# Patient Record
Sex: MALE | Race: WHITE | NOT HISPANIC OR LATINO | URBAN - NONMETROPOLITAN AREA
[De-identification: names, ages, dates, MRNs, and addresses within clinical notes are randomized per-mention and may not be internally consistent; named-entity substitution may affect disease eponyms.]

---

## 2020-04-27 PROBLEM — Z00.00 ENCOUNTER FOR PREVENTIVE HEALTH EXAMINATION: Status: ACTIVE | Noted: 2020-04-27

## 2024-01-26 ENCOUNTER — TELEPHONE ENCOUNTER (OUTPATIENT)
Dept: URBAN - NONMETROPOLITAN AREA CLINIC 2 | Facility: CLINIC | Age: 84
End: 2024-01-26

## 2024-01-31 ENCOUNTER — DASHBOARD ENCOUNTERS (OUTPATIENT)
Age: 84
End: 2024-01-31

## 2024-01-31 ENCOUNTER — OFFICE VISIT (OUTPATIENT)
Dept: URBAN - NONMETROPOLITAN AREA CLINIC 2 | Facility: CLINIC | Age: 84
End: 2024-01-31
Payer: MEDICARE

## 2024-01-31 VITALS
WEIGHT: 171 LBS | HEART RATE: 102 BPM | BODY MASS INDEX: 25.33 KG/M2 | SYSTOLIC BLOOD PRESSURE: 102 MMHG | HEIGHT: 69 IN | DIASTOLIC BLOOD PRESSURE: 62 MMHG

## 2024-01-31 DIAGNOSIS — K44.9 PARAESOPHAGEAL HERNIA: ICD-10-CM

## 2024-01-31 DIAGNOSIS — K86.9 PANCREATIC DISEASE: ICD-10-CM

## 2024-01-31 PROCEDURE — 99204 OFFICE O/P NEW MOD 45 MIN: CPT

## 2024-01-31 RX ORDER — METOPROLOL SUCCINATE 25 MG/1
TABLET, EXTENDED RELEASE ORAL
Qty: 0 | Refills: 0 | Status: ACTIVE | COMMUNITY
Start: 1900-01-01 | End: 1900-01-01

## 2024-01-31 RX ORDER — ROSUVASTATIN CALCIUM 20 MG/1
TABLET, FILM COATED ORAL
Qty: 0 | Refills: 0 | Status: ACTIVE | COMMUNITY
Start: 1900-01-01 | End: 1900-01-01

## 2024-01-31 RX ORDER — METFORMIN HYDROCHLORIDE 500 MG/1
TABLET, COATED ORAL 2
Qty: 0 | Refills: 0 | Status: ACTIVE | COMMUNITY
Start: 1900-01-01 | End: 1900-01-01

## 2024-01-31 RX ORDER — LOSARTAN POTASSIUM 50 MG/1
TAKE 1 TABLET (50 MG) BY ORAL ROUTE ONCE DAILY TABLET ORAL 1
Qty: 0 | Refills: 0 | Status: ACTIVE | COMMUNITY
Start: 1900-01-01 | End: 1900-01-01

## 2024-01-31 RX ORDER — FOLIC ACID 1 MG/1
TAKE 1 TABLET (1 MG) BY ORAL ROUTE ONCE DAILY TABLET ORAL 1
Qty: 0 | Refills: 0 | Status: ACTIVE | COMMUNITY
Start: 1900-01-01 | End: 1900-01-01

## 2024-01-31 NOTE — HPI-TODAY'S VISIT:
1/31/24 Mr. Lopez presents to discuss pancreatic cyst with pmh of CAD s/p CABG 2015, HTN, HLD and fatty liver. He was admitted in Dec 2023 to [facility] for chills and malaise, with necrotizing pancreatitis and peripancreatic collection.  Pancreatic cyst in tail region  on CT scan along with elevated LFTs recommended to complete EUS outpatient.  No previous pancreatitis episodes. 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 Pancreatic collection extended into hernia sac. He is symptomatic with his paraesophageal hernia and anticipates surgical evaluation which is complex and he has been referred out of [place] to [place] by Dr. Le. EUS with FNA is recommended prior. He continues with pending appointment with Dr. Martinez with thoracic surgery. He denies epigastric pain and has a low appetite. He is able to drink 3 supplement drinks a day and tolerates his diet including tuna on crackers with no pain or nausea. Main complaint today is weakness. Today he is using a wheelchair, previously able to ambulate.  He was drinking one glass of wine daily prior to diagnosis and has discontinued. Today we discussed reviewing his case with our advanced endoscopist and he will return to ED if weakness advances.  Discussed with Dr. Gagnon in clinic along with patient, risk v benefit of completing this in [place] v. [place] given optimal resources. SHIKHA

## 2024-02-15 PROBLEM — 3662000: Status: ACTIVE | Noted: 2024-02-15

## 2024-02-15 PROBLEM — 3855007: Status: ACTIVE | Noted: 2024-02-15

## 2024-08-06 ENCOUNTER — OFFICE VISIT (OUTPATIENT)
Dept: URBAN - NONMETROPOLITAN AREA CLINIC 2 | Facility: CLINIC | Age: 84
End: 2024-08-06
Payer: MEDICARE

## 2024-08-06 ENCOUNTER — LAB OUTSIDE AN ENCOUNTER (OUTPATIENT)
Dept: URBAN - NONMETROPOLITAN AREA CLINIC 2 | Facility: CLINIC | Age: 84
End: 2024-08-06

## 2024-08-06 VITALS
WEIGHT: 185 LBS | BODY MASS INDEX: 27.4 KG/M2 | HEART RATE: 86 BPM | TEMPERATURE: 98.3 F | SYSTOLIC BLOOD PRESSURE: 125 MMHG | HEIGHT: 69 IN | DIASTOLIC BLOOD PRESSURE: 64 MMHG

## 2024-08-06 DIAGNOSIS — K44.9 PARAESOPHAGEAL HERNIA: ICD-10-CM

## 2024-08-06 DIAGNOSIS — R93.5 ABNORMAL CT OF THE ABDOMEN: ICD-10-CM

## 2024-08-06 DIAGNOSIS — K85.90 ACUTE PANCREATITIS, UNSPECIFIED COMPLICATION STATUS, UNSPECIFIED PANCREATITIS TYPE: ICD-10-CM

## 2024-08-06 PROCEDURE — 99214 OFFICE O/P EST MOD 30 MIN: CPT | Performed by: NURSE PRACTITIONER

## 2024-08-06 RX ORDER — FOLIC ACID 1 MG/1
TAKE 1 TABLET (1 MG) BY ORAL ROUTE ONCE DAILY TABLET ORAL 1
Qty: 0 | Refills: 0 | Status: ACTIVE | COMMUNITY
Start: 1900-01-01

## 2024-08-06 RX ORDER — LOSARTAN POTASSIUM 50 MG/1
TAKE 1 TABLET (50 MG) BY ORAL ROUTE ONCE DAILY TABLET ORAL 1
Qty: 0 | Refills: 0 | Status: ACTIVE | COMMUNITY
Start: 1900-01-01

## 2024-08-06 RX ORDER — METOPROLOL SUCCINATE 25 MG/1
TABLET, EXTENDED RELEASE ORAL
Qty: 0 | Refills: 0 | Status: ACTIVE | COMMUNITY
Start: 1900-01-01

## 2024-08-06 RX ORDER — ROSUVASTATIN CALCIUM 20 MG/1
TABLET, FILM COATED ORAL
Qty: 0 | Refills: 0 | Status: ACTIVE | COMMUNITY
Start: 1900-01-01

## 2024-08-06 RX ORDER — METFORMIN HYDROCHLORIDE 500 MG/1
TABLET, COATED ORAL 2
Qty: 0 | Refills: 0 | Status: ACTIVE | COMMUNITY
Start: 1900-01-01

## 2024-08-06 RX ORDER — SODIUM, POTASSIUM,MAG SULFATES 17.5-3.13G
AS DIRECTED SOLUTION, RECONSTITUTED, ORAL ORAL
Qty: 1 | Refills: 0 | OUTPATIENT
Start: 2024-08-06 | End: 2024-08-08

## 2024-08-06 NOTE — HPI-TODAY'S VISIT:
1/31/24 Mr. Lopez presents to discuss pancreatic cyst with pmh of CAD s/p CABG 2015, HTN, HLD and fatty liver. He was admitted in Dec 2023 to [facility] for chills and malaise, with necrotizing pancreatitis and peripancreatic collection.  Pancreatic cyst in tail region  on CT scan along with elevated LFTs recommended to complete EUS outpatient.  No previous pancreatitis episodes. 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 Pancreatic collection extended into hernia sac. He is symptomatic with his paraesophageal hernia and anticipates surgical evaluation which is complex and he has been referred out of [place] to [place] by Dr. Le. EUS with FNA is recommended prior. He continues with pending appointment with Dr. Martinez with thoracic surgery. He denies epigastric pain and has a low appetite. He is able to drink 3 supplement drinks a day and tolerates his diet including tuna on crackers with no pain or nausea. Main complaint today is weakness. Today he is using a wheelchair, previously able to ambulate.  He was drinking one glass of wine daily prior to diagnosis and has discontinued. Today we discussed reviewing his case with our advanced endoscopist and he will return to ED if weakness advances.  Discussed with Dr. Gagnon in clinic along with patient, risk v benefit of completing this in [place] v. [place] given optimal resources. SP 8/6/24 Patient returns of f/u of pancreatic cyst as well to discuss colon. has been seen by [facility]. s/p EUS with draingage of cyst. symptoms much improved. known paraesophageal hernia, but reports he is doing well so declining sx. they are repeating imaging every 3-6 months.  he is here today for colonoscopy. one of his CTs showed rectal wall thickening. no gi complaints. agreeable to colon. 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  his high school sweetheart 7 years ago. sb

## 2024-09-04 ENCOUNTER — CLAIMS CREATED FROM THE CLAIM WINDOW (OUTPATIENT)
Dept: URBAN - METROPOLITAN AREA CLINIC 4 | Facility: CLINIC | Age: 84
End: 2024-09-04
Payer: MEDICARE

## 2024-09-04 ENCOUNTER — CLAIMS CREATED FROM THE CLAIM WINDOW (OUTPATIENT)
Dept: URBAN - NONMETROPOLITAN AREA SURGERY CENTER 1 | Facility: SURGERY CENTER | Age: 84
End: 2024-09-04
Payer: MEDICARE

## 2024-09-04 DIAGNOSIS — R93.3 ABNORMAL FINDINGS ON DIAGNOSTIC IMAGING OF OTHER PARTS OF DIGESTIVE TRACT: ICD-10-CM

## 2024-09-04 DIAGNOSIS — R93.3 ABNORMAL FINDING ON DIAGNOSTIC IMAGING OF GI TRACT: ICD-10-CM

## 2024-09-04 DIAGNOSIS — D12.2 BENIGN NEOPLASM OF ASCENDING COLON: ICD-10-CM

## 2024-09-04 DIAGNOSIS — K63.5 POLYP OF ASCENDING COLON, UNSPECIFIED TYPE: ICD-10-CM

## 2024-09-04 DIAGNOSIS — K57.30 DIVERTICULOSIS OF COLON: ICD-10-CM

## 2024-09-04 PROCEDURE — 88305 TISSUE EXAM BY PATHOLOGIST: CPT | Performed by: PATHOLOGY

## 2024-09-04 PROCEDURE — 00811 ANES LWR INTST NDSC NOS: CPT | Performed by: NURSE ANESTHETIST, CERTIFIED REGISTERED

## 2024-09-04 PROCEDURE — 45385 COLONOSCOPY W/LESION REMOVAL: CPT | Performed by: CLINIC/CENTER

## 2024-09-04 PROCEDURE — 45385 COLONOSCOPY W/LESION REMOVAL: CPT | Performed by: INTERNAL MEDICINE

## 2024-09-04 RX ORDER — METFORMIN HYDROCHLORIDE 500 MG/1
TABLET, COATED ORAL 2
Qty: 0 | Refills: 0 | Status: ACTIVE | COMMUNITY
Start: 1900-01-01

## 2024-09-04 RX ORDER — METOPROLOL SUCCINATE 25 MG/1
TABLET, EXTENDED RELEASE ORAL
Qty: 0 | Refills: 0 | Status: ACTIVE | COMMUNITY
Start: 1900-01-01

## 2024-09-04 RX ORDER — LOSARTAN POTASSIUM 50 MG/1
TAKE 1 TABLET (50 MG) BY ORAL ROUTE ONCE DAILY TABLET ORAL 1
Qty: 0 | Refills: 0 | Status: ACTIVE | COMMUNITY
Start: 1900-01-01

## 2024-09-04 RX ORDER — FOLIC ACID 1 MG/1
TAKE 1 TABLET (1 MG) BY ORAL ROUTE ONCE DAILY TABLET ORAL 1
Qty: 0 | Refills: 0 | Status: ACTIVE | COMMUNITY
Start: 1900-01-01

## 2024-09-04 RX ORDER — ROSUVASTATIN CALCIUM 20 MG/1
TABLET, FILM COATED ORAL
Qty: 0 | Refills: 0 | Status: ACTIVE | COMMUNITY
Start: 1900-01-01

## 2024-11-08 ENCOUNTER — OFFICE VISIT (OUTPATIENT)
Dept: URBAN - NONMETROPOLITAN AREA CLINIC 2 | Facility: CLINIC | Age: 84
End: 2024-11-08
Payer: MEDICARE

## 2024-11-08 ENCOUNTER — LAB OUTSIDE AN ENCOUNTER (OUTPATIENT)
Dept: URBAN - NONMETROPOLITAN AREA CLINIC 2 | Facility: CLINIC | Age: 84
End: 2024-11-08

## 2024-11-08 VITALS
SYSTOLIC BLOOD PRESSURE: 183 MMHG | DIASTOLIC BLOOD PRESSURE: 79 MMHG | TEMPERATURE: 97.1 F | HEART RATE: 77 BPM | BODY MASS INDEX: 29.33 KG/M2 | WEIGHT: 198 LBS | HEIGHT: 69 IN

## 2024-11-08 DIAGNOSIS — Z86.0101 PERSONAL HISTORY OF ADENOMATOUS AND SERRATED COLON POLYPS: ICD-10-CM

## 2024-11-08 DIAGNOSIS — K85.90 ACUTE PANCREATITIS, UNSPECIFIED COMPLICATION STATUS, UNSPECIFIED PANCREATITIS TYPE: ICD-10-CM

## 2024-11-08 DIAGNOSIS — R93.5 ABNORMAL CT OF THE ABDOMEN: ICD-10-CM

## 2024-11-08 DIAGNOSIS — K44.9 PARAESOPHAGEAL HERNIA: ICD-10-CM

## 2024-11-08 DIAGNOSIS — Z09 ENCOUNTER FOR FOLLOW-UP: ICD-10-CM

## 2024-11-08 PROCEDURE — 99214 OFFICE O/P EST MOD 30 MIN: CPT | Performed by: NURSE PRACTITIONER

## 2024-11-08 RX ORDER — METFORMIN HYDROCHLORIDE 500 MG/1
TABLET, COATED ORAL 2
Qty: 0 | Refills: 0 | Status: ACTIVE | COMMUNITY
Start: 1900-01-01

## 2024-11-08 RX ORDER — METOPROLOL SUCCINATE 25 MG/1
TABLET, EXTENDED RELEASE ORAL
Qty: 0 | Refills: 0 | Status: ACTIVE | COMMUNITY
Start: 1900-01-01

## 2024-11-08 RX ORDER — LOSARTAN POTASSIUM 50 MG/1
TAKE 1 TABLET (50 MG) BY ORAL ROUTE ONCE DAILY TABLET ORAL 1
Qty: 0 | Refills: 0 | Status: ACTIVE | COMMUNITY
Start: 1900-01-01

## 2024-11-08 RX ORDER — ROSUVASTATIN CALCIUM 20 MG/1
TABLET, FILM COATED ORAL
Qty: 0 | Refills: 0 | Status: ACTIVE | COMMUNITY
Start: 1900-01-01

## 2024-11-08 RX ORDER — FOLIC ACID 1 MG/1
TAKE 1 TABLET (1 MG) BY ORAL ROUTE ONCE DAILY TABLET ORAL 1
Qty: 0 | Refills: 0 | Status: ACTIVE | COMMUNITY
Start: 1900-01-01

## 2024-11-08 NOTE — HPI-TODAY'S VISIT:
1/31/24 Mr. Lopez presents to discuss pancreatic cyst with pmh of CAD s/p CABG 2015, HTN, HLD and fatty liver. He was admitted in Dec 2023 to [facility] for chills and malaise, with necrotizing pancreatitis and peripancreatic collection.  Pancreatic cyst in tail region  on CT scan along with elevated LFTs recommended to complete EUS outpatient.  No previous pancreatitis episodes. 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 Pancreatic collection extended into hernia sac. He is symptomatic with his paraesophageal hernia and anticipates surgical evaluation which is complex and he has been referred out of [place] to [place] by Dr. Le. EUS with FNA is recommended prior. He continues with pending appointment with Dr. Martinez with thoracic surgery. He denies epigastric pain and has a low appetite. He is able to drink 3 supplement drinks a day and tolerates his diet including tuna on crackers with no pain or nausea. Main complaint today is weakness. Today he is using a wheelchair, previously able to ambulate.  He was drinking one glass of wine daily prior to diagnosis and has discontinued. Today we discussed reviewing his case with our advanced endoscopist and he will return to ED if weakness advances.  Discussed with Dr. Gagnon in clinic along with patient, risk v benefit of completing this in [place] v. [place] given optimal resources. SP 8/6/24 Patient returns of f/u of pancreatic cyst as well to discuss colon. has been seen by [facility]. s/p EUS with draingage of cyst. symptoms much improved. known paraesophageal hernia, but reports he is doing well so declining sx. they are repeating imaging every 3-6 months.  he is here today for colonoscopy. one of his CTs showed rectal wall thickening. no gi complaints. agreeable to colon. 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  his high school sweetheart 7 years ago. sb  9/4/24 COlonoscopy diverticulosis, 1 ascend TA-repeat in 5 years 11/8/2024 lab returns for follow-up of colonoscopy as well as to discuss abnormal CT imaging.  He has actually been doing quite well and has no GI symptoms.  His only symptom to speak of is he has had an increased craving for salt, which is very unusual for him.  He follows with Dr. Cfofman of cardiology.  He said a history of paraesophageal hernia as well as possibly gallstone pancreatitis.  He is due for repeat imaging of this.  He was following with hepatobiliary in [place], but they have signed off to the care per his report sb

## 2024-11-09 LAB
A/G RATIO: 1.5
ABSOLUTE BASOPHILS: 43
ABSOLUTE EOSINOPHILS: 153
ABSOLUTE LYMPHOCYTES: 1732
ABSOLUTE MONOCYTES: 830
ABSOLUTE NEUTROPHILS: 3343
ALBUMIN: 3.8
ALKALINE PHOSPHATASE: 63
ALT (SGPT): 17
AST (SGOT): 29
BASOPHILS: 0.7
BILIRUBIN, TOTAL: 0.7
BUN/CREATININE RATIO: (no result)
BUN: 23
CALCIUM: 8.8
CARBON DIOXIDE, TOTAL: 23
CHLORIDE: 108
CREATININE: 1.14
EGFR: 64
EOSINOPHILS: 2.5
GLOBULIN, TOTAL: 2.6
GLUCOSE: 104
HEMATOCRIT: 34.5
HEMOGLOBIN: 10.4
LYMPHOCYTES: 28.4
MCH: 26.5
MCHC: 30.1
MCV: 88
MONOCYTES: 13.6
MPV: 12
NEUTROPHILS: 54.8
PLATELET COUNT: 168
POTASSIUM: 4.8
PROTEIN, TOTAL: 6.4
RDW: 17
RED BLOOD CELL COUNT: 3.92
SODIUM: 140
WHITE BLOOD CELL COUNT: 6.1

## 2024-11-12 ENCOUNTER — WEB ENCOUNTER (OUTPATIENT)
Dept: URBAN - NONMETROPOLITAN AREA CLINIC 2 | Facility: CLINIC | Age: 84
End: 2024-11-12